# Patient Record
Sex: MALE | Race: WHITE | NOT HISPANIC OR LATINO | URBAN - METROPOLITAN AREA
[De-identification: names, ages, dates, MRNs, and addresses within clinical notes are randomized per-mention and may not be internally consistent; named-entity substitution may affect disease eponyms.]

---

## 2017-06-29 ENCOUNTER — OFFICE (OUTPATIENT)
Dept: URBAN - METROPOLITAN AREA CLINIC 101 | Facility: CLINIC | Age: 72
End: 2017-06-29

## 2017-06-29 VITALS
HEART RATE: 91 BPM | HEIGHT: 72 IN | WEIGHT: 259 LBS | SYSTOLIC BLOOD PRESSURE: 137 MMHG | DIASTOLIC BLOOD PRESSURE: 87 MMHG | TEMPERATURE: 98.4 F

## 2017-06-29 DIAGNOSIS — I48.0 PAROXYSMAL ATRIAL FIBRILLATION: ICD-10-CM

## 2017-06-29 DIAGNOSIS — Z86.010 PERSONAL HISTORY OF COLONIC POLYPS: ICD-10-CM

## 2017-06-29 DIAGNOSIS — Z80.0 FAMILY HISTORY OF MALIGNANT NEOPLASM OF DIGESTIVE ORGANS: ICD-10-CM

## 2017-06-29 PROCEDURE — 99204 OFFICE O/P NEW MOD 45 MIN: CPT

## 2017-06-29 NOTE — SERVICEHPINOTES
TONY NEW   is a   72   year old male who is being seen in consultation at the request of   RAFAT SHARMA   for history of colon polyps and family history of colon cancer. His mother had colon cancer. His last colonoscopy was 1/13/2009-polyp and diverticulosis-repeat in 5 years. He has a BM at least once a day. Stools are BSS type 4. Denies hematochezia, abdominal pain and weight loss.He has a-fib. He has been managed with Xarelto for approximately 3 years now. He stopped it 2 days prior to having wisdom tooth removed without any problem. Denies chest pain and CHUNG. He follows with Dr. Sharma-cardiologist every 6 months. No prior MI or CVA.

## 2017-09-07 ENCOUNTER — ON CAMPUS - OUTPATIENT (OUTPATIENT)
Dept: URBAN - METROPOLITAN AREA HOSPITAL 35 | Facility: HOSPITAL | Age: 72
End: 2017-09-07
Payer: MEDICARE

## 2017-09-07 DIAGNOSIS — Z80.0 FAMILY HISTORY OF MALIGNANT NEOPLASM OF DIGESTIVE ORGANS: ICD-10-CM

## 2017-09-07 DIAGNOSIS — Z86.010 PERSONAL HISTORY OF COLONIC POLYPS: ICD-10-CM

## 2017-09-07 PROCEDURE — G0105 COLORECTAL SCRN; HI RISK IND: HCPCS
